# Patient Record
Sex: MALE | Race: WHITE | NOT HISPANIC OR LATINO | Employment: OTHER | URBAN - NONMETROPOLITAN AREA
[De-identification: names, ages, dates, MRNs, and addresses within clinical notes are randomized per-mention and may not be internally consistent; named-entity substitution may affect disease eponyms.]

---

## 2023-11-03 DIAGNOSIS — E78.2 MIXED HYPERLIPIDEMIA: ICD-10-CM

## 2023-11-13 PROBLEM — I73.00 RAYNAUD DISEASE: Status: ACTIVE | Noted: 2023-11-13

## 2023-11-13 PROBLEM — R47.81 SLURRED SPEECH: Status: ACTIVE | Noted: 2023-11-13

## 2023-11-13 PROBLEM — I25.10 ARTERIOSCLEROSIS OF CORONARY ARTERY: Status: ACTIVE | Noted: 2023-11-13

## 2023-11-13 PROBLEM — I48.0 PAROXYSMAL ATRIAL FIBRILLATION (MULTI): Status: ACTIVE | Noted: 2023-11-13

## 2023-11-13 PROBLEM — Z95.1 S/P CABG (CORONARY ARTERY BYPASS GRAFT): Status: ACTIVE | Noted: 2023-11-13

## 2023-11-13 PROBLEM — E78.5 HYPERLIPIDEMIA: Status: ACTIVE | Noted: 2023-11-13

## 2023-11-13 PROBLEM — G47.30 SLEEP APNEA: Status: ACTIVE | Noted: 2023-11-13

## 2023-11-13 PROBLEM — I10 ESSENTIAL HYPERTENSION: Status: ACTIVE | Noted: 2023-11-13

## 2023-11-13 RX ORDER — EZETIMIBE 10 MG/1
10 TABLET ORAL DAILY
COMMUNITY

## 2023-11-13 RX ORDER — OMEPRAZOLE 40 MG/1
40 CAPSULE, DELAYED RELEASE ORAL DAILY
COMMUNITY

## 2023-11-13 RX ORDER — LOSARTAN POTASSIUM 100 MG/1
100 TABLET ORAL DAILY
COMMUNITY
End: 2024-03-19

## 2023-11-13 RX ORDER — SILDENAFIL 25 MG/1
25 TABLET, FILM COATED ORAL AS NEEDED
COMMUNITY

## 2023-11-13 RX ORDER — SOTALOL HYDROCHLORIDE 80 MG/1
80 TABLET ORAL 2 TIMES DAILY
COMMUNITY
Start: 2023-09-28

## 2023-11-13 RX ORDER — ASPIRIN 81 MG/1
81 TABLET ORAL DAILY
COMMUNITY

## 2023-11-13 RX ORDER — HYDROCHLOROTHIAZIDE 12.5 MG/1
12.5 TABLET ORAL DAILY
COMMUNITY
Start: 2023-09-28 | End: 2024-04-25

## 2023-11-14 RX ORDER — EZETIMIBE 10 MG/1
10 TABLET ORAL DAILY
Qty: 90 TABLET | Refills: 3 | Status: SHIPPED | OUTPATIENT
Start: 2023-11-14

## 2024-03-06 PROBLEM — Z79.899 HIGH RISK MEDICATION USE: Status: ACTIVE | Noted: 2024-03-06

## 2024-03-19 DIAGNOSIS — I10 ESSENTIAL HYPERTENSION: ICD-10-CM

## 2024-03-19 RX ORDER — LOSARTAN POTASSIUM 100 MG/1
100 TABLET ORAL DAILY
Qty: 90 TABLET | Refills: 3 | Status: SHIPPED | OUTPATIENT
Start: 2024-03-19

## 2024-04-21 DIAGNOSIS — I10 ESSENTIAL HYPERTENSION: ICD-10-CM

## 2024-04-25 RX ORDER — HYDROCHLOROTHIAZIDE 12.5 MG/1
12.5 TABLET ORAL DAILY
Qty: 90 TABLET | Refills: 3 | Status: SHIPPED | OUTPATIENT
Start: 2024-04-25

## 2024-06-17 DIAGNOSIS — I48.0 PAROXYSMAL ATRIAL FIBRILLATION (MULTI): ICD-10-CM

## 2024-06-17 RX ORDER — SOTALOL HYDROCHLORIDE 80 MG/1
80 TABLET ORAL 2 TIMES DAILY
Qty: 180 TABLET | Refills: 3 | Status: SHIPPED | OUTPATIENT
Start: 2024-06-17 | End: 2025-06-17

## 2024-07-08 ENCOUNTER — APPOINTMENT (OUTPATIENT)
Dept: CARDIOLOGY | Facility: CLINIC | Age: 64
End: 2024-07-08
Payer: COMMERCIAL

## 2024-07-08 VITALS
HEIGHT: 74 IN | HEART RATE: 69 BPM | DIASTOLIC BLOOD PRESSURE: 80 MMHG | BODY MASS INDEX: 40.43 KG/M2 | SYSTOLIC BLOOD PRESSURE: 130 MMHG | WEIGHT: 315 LBS

## 2024-07-08 DIAGNOSIS — I48.0 PAROXYSMAL ATRIAL FIBRILLATION (MULTI): Primary | ICD-10-CM

## 2024-07-08 DIAGNOSIS — I25.10 ARTERIOSCLEROSIS OF CORONARY ARTERY: ICD-10-CM

## 2024-07-08 DIAGNOSIS — E78.2 MIXED HYPERLIPIDEMIA: ICD-10-CM

## 2024-07-08 DIAGNOSIS — Z79.899 HIGH RISK MEDICATION USE: ICD-10-CM

## 2024-07-08 DIAGNOSIS — Z95.1 S/P CABG (CORONARY ARTERY BYPASS GRAFT): ICD-10-CM

## 2024-07-08 DIAGNOSIS — I10 ESSENTIAL HYPERTENSION: ICD-10-CM

## 2024-07-08 PROCEDURE — 3079F DIAST BP 80-89 MM HG: CPT | Performed by: INTERNAL MEDICINE

## 2024-07-08 PROCEDURE — 93000 ELECTROCARDIOGRAM COMPLETE: CPT | Performed by: INTERNAL MEDICINE

## 2024-07-08 PROCEDURE — 3075F SYST BP GE 130 - 139MM HG: CPT | Performed by: INTERNAL MEDICINE

## 2024-07-08 PROCEDURE — 99214 OFFICE O/P EST MOD 30 MIN: CPT | Performed by: INTERNAL MEDICINE

## 2024-07-08 PROCEDURE — 3008F BODY MASS INDEX DOCD: CPT | Performed by: INTERNAL MEDICINE

## 2024-07-08 PROCEDURE — 1036F TOBACCO NON-USER: CPT | Performed by: INTERNAL MEDICINE

## 2024-07-08 NOTE — LETTER
"July 8, 2024     Shelton Buck DO  [address]  [facility] [address]    Patient: Regulo Smith   YOB: 1960   Date of Visit: 7/8/2024       Dear Dr. Shelton Buck, DO:    Thank you for referring Regulo Smith to me for evaluation. Below are my notes for this consultation.  If you have questions, please do not hesitate to call me. I look forward to following your patient along with you.       Sincerely,     Jacob Henderson MD      CC: No Recipients  ______________________________________________________________________________________    Subjective   Regulo Smith is a 63 y.o. male       Chief Complaint    Annual Exam          HPI     Patient returns in follow-up of problems as noted.  In the interim he is done relatively well.  He denies significant arrhythmia symptomatology but admits he only takes his sotalol once a day.  He was strongly encouraged to switch to twice a day and he agrees to do so.    His EKG is difficult to interpret.  Much like his previous EKGs P waves are very small.  He also has PACs making rhythm determination problematic.  He has, though, very visible P waves in leads V1 and V2.  First-degree AV block is noted.  Otherwise EKG is satisfactory.  Because of all the above we believe he is doing well we suggest continued therapy as is.  We did discuss the merits of antithrombotic therapy but he is reluctant to do so and overall his young age lowers his risk of stroke to a significant degree and because of this we suggest he could forego antithrombotic therapy at this time.  We did, though, advocate the merits of diet and weight loss to help improve his blood pressure.    Vitals:    07/08/24 0838 07/08/24 0918 07/08/24 1732   BP: (!) 140/94 (!) 160/96 130/80   BP Location: Left arm Left arm Right arm   Patient Position: Sitting Sitting Sitting   Pulse: 69     Weight: (!) 151 kg (332 lb)     Height: 1.88 m (6' 2\")          EKG done in office today     Objective "   Physical Exam  Constitutional:       Appearance: Normal appearance.   HENT:      Nose: Nose normal.   Neck:      Vascular: No carotid bruit.   Cardiovascular:      Rate and Rhythm: Normal rate.      Pulses: Normal pulses.      Heart sounds: Normal heart sounds.   Pulmonary:      Effort: Pulmonary effort is normal.   Abdominal:      General: Bowel sounds are normal.      Palpations: Abdomen is soft.   Musculoskeletal:         General: Normal range of motion.      Cervical back: Normal range of motion.      Right lower leg: No edema.      Left lower leg: No edema.   Skin:     General: Skin is warm and dry.   Neurological:      General: No focal deficit present.      Mental Status: He is alert.   Psychiatric:         Mood and Affect: Mood normal.         Behavior: Behavior normal.         Thought Content: Thought content normal.         Judgment: Judgment normal.         Allergies  Statins-hmg-coa reductase inhibitors     Current Medications    Current Outpatient Medications:   •  aspirin 81 mg EC tablet, Take 1 tablet (81 mg) by mouth once daily., Disp: , Rfl:   •  ezetimibe (Zetia) 10 mg tablet, Take 1 tablet by mouth once daily, Disp: 90 tablet, Rfl: 3  •  hydroCHLOROthiazide (Microzide) 12.5 mg tablet, Take 1 tablet by mouth once daily, Disp: 90 tablet, Rfl: 3  •  losartan (Cozaar) 100 mg tablet, Take 1 tablet by mouth once daily, Disp: 90 tablet, Rfl: 3  •  omeprazole (PriLOSEC) 40 mg DR capsule, Take 1 capsule (40 mg) by mouth once daily., Disp: , Rfl:   •  sildenafil (Viagra) 25 mg tablet, Take 1 tablet (25 mg) by mouth if needed for erectile dysfunction., Disp: , Rfl:   •  sotalol (Betapace) 80 mg tablet, Take 1 tablet (80 mg) by mouth 2 times a day., Disp: 180 tablet, Rfl: 3                     Assessment/Plan   1. Paroxysmal atrial fibrillation (Multi)  Sinus rhythm maintained effectively.  EKG difficult to interpret    2. S/P CABG (coronary artery bypass graft)  Asymptomatic following surgery.  Durable  relief of angina has been achieved    3. Essential hypertension  Review of treatment strategy demonstrates acceptable control    4. Arteriosclerosis of coronary artery  Asymptomatic ever since bypass    5. Mixed hyperlipidemia  Review of treatment strategy demonstrates acceptable control    6. High risk medication use  Acceptable QT interval on sotalol therapy.    7. BMI 40.0-44.9, adult (Multi)  The merits of diet and weight loss were advocated        Scribe Attestation  By signing my name below, I, Andrea Nur LPN   attest that this documentation has been prepared under the direction and in the presence of Jacob Henderson MD.     Provider Attestation - Scribe documentation    All medical record entries made by the Scribe were at my direction and personally dictated by me. I have reviewed the chart and agree that the record accurately reflects my personal performance of the history, physical exam, discussion and plan.

## 2024-07-08 NOTE — PROGRESS NOTES
"Subjective   Regulo Smith is a 63 y.o. male       Chief Complaint    Annual Exam          HPI     Patient returns in follow-up of problems as noted.  In the interim he is done relatively well.  He denies significant arrhythmia symptomatology but admits he only takes his sotalol once a day.  He was strongly encouraged to switch to twice a day and he agrees to do so.    His EKG is difficult to interpret.  Much like his previous EKGs P waves are very small.  He also has PACs making rhythm determination problematic.  He has, though, very visible P waves in leads V1 and V2.  First-degree AV block is noted.  Otherwise EKG is satisfactory.  Because of all the above we believe he is doing well we suggest continued therapy as is.  We did discuss the merits of antithrombotic therapy but he is reluctant to do so and overall his young age lowers his risk of stroke to a significant degree and because of this we suggest he could forego antithrombotic therapy at this time.  We did, though, advocate the merits of diet and weight loss to help improve his blood pressure.    Vitals:    07/08/24 0838 07/08/24 0918 07/08/24 1732   BP: (!) 140/94 (!) 160/96 130/80   BP Location: Left arm Left arm Right arm   Patient Position: Sitting Sitting Sitting   Pulse: 69     Weight: (!) 151 kg (332 lb)     Height: 1.88 m (6' 2\")          EKG done in office today     Objective   Physical Exam  Constitutional:       Appearance: Normal appearance.   HENT:      Nose: Nose normal.   Neck:      Vascular: No carotid bruit.   Cardiovascular:      Rate and Rhythm: Normal rate.      Pulses: Normal pulses.      Heart sounds: Normal heart sounds.   Pulmonary:      Effort: Pulmonary effort is normal.   Abdominal:      General: Bowel sounds are normal.      Palpations: Abdomen is soft.   Musculoskeletal:         General: Normal range of motion.      Cervical back: Normal range of motion.      Right lower leg: No edema.      Left lower leg: No edema. "   Skin:     General: Skin is warm and dry.   Neurological:      General: No focal deficit present.      Mental Status: He is alert.   Psychiatric:         Mood and Affect: Mood normal.         Behavior: Behavior normal.         Thought Content: Thought content normal.         Judgment: Judgment normal.         Allergies  Statins-hmg-coa reductase inhibitors     Current Medications    Current Outpatient Medications:     aspirin 81 mg EC tablet, Take 1 tablet (81 mg) by mouth once daily., Disp: , Rfl:     ezetimibe (Zetia) 10 mg tablet, Take 1 tablet by mouth once daily, Disp: 90 tablet, Rfl: 3    hydroCHLOROthiazide (Microzide) 12.5 mg tablet, Take 1 tablet by mouth once daily, Disp: 90 tablet, Rfl: 3    losartan (Cozaar) 100 mg tablet, Take 1 tablet by mouth once daily, Disp: 90 tablet, Rfl: 3    omeprazole (PriLOSEC) 40 mg DR capsule, Take 1 capsule (40 mg) by mouth once daily., Disp: , Rfl:     sildenafil (Viagra) 25 mg tablet, Take 1 tablet (25 mg) by mouth if needed for erectile dysfunction., Disp: , Rfl:     sotalol (Betapace) 80 mg tablet, Take 1 tablet (80 mg) by mouth 2 times a day., Disp: 180 tablet, Rfl: 3                     Assessment/Plan   1. Paroxysmal atrial fibrillation (Multi)  Sinus rhythm maintained effectively.  EKG difficult to interpret    2. S/P CABG (coronary artery bypass graft)  Asymptomatic following surgery.  Durable relief of angina has been achieved    3. Essential hypertension  Review of treatment strategy demonstrates acceptable control    4. Arteriosclerosis of coronary artery  Asymptomatic ever since bypass    5. Mixed hyperlipidemia  Review of treatment strategy demonstrates acceptable control    6. High risk medication use  Acceptable QT interval on sotalol therapy.    7. BMI 40.0-44.9, adult (Multi)  The merits of diet and weight loss were advocated        Scribe Attestation  By signing my name below, I, Nanette TOTH LPN , Scribe   attest that this documentation has been prepared  under the direction and in the presence of Jacob Henderson MD.     Provider Attestation - Scribe documentation    All medical record entries made by the Scribe were at my direction and personally dictated by me. I have reviewed the chart and agree that the record accurately reflects my personal performance of the history, physical exam, discussion and plan.

## 2024-09-06 ENCOUNTER — TELEPHONE (OUTPATIENT)
Dept: CARDIOLOGY | Facility: CLINIC | Age: 64
End: 2024-09-06
Payer: COMMERCIAL

## 2024-09-06 NOTE — TELEPHONE ENCOUNTER
Patient scheduled for hernia surgery on 9/30/24 with Dr. Mead. They are requesting cardiac clearance. Please advise     Fax # [phone]

## 2024-10-08 ENCOUNTER — TELEPHONE (OUTPATIENT)
Dept: CARDIOLOGY | Facility: CLINIC | Age: 64
End: 2024-10-08
Payer: COMMERCIAL

## 2024-10-08 DIAGNOSIS — I48.0 PAROXYSMAL ATRIAL FIBRILLATION (MULTI): ICD-10-CM

## 2024-10-08 DIAGNOSIS — R00.2 PALPITATIONS: ICD-10-CM

## 2024-10-09 NOTE — TELEPHONE ENCOUNTER
"Patient presents to office with complaint of a-fib and inquired about EKG. States he has noticed over the past 3-4 months and increase of palps. Duration is 5-10 min and frequency about 3 times weekly. Patient does have mild dyspnea when occurs. States he does have a loop recorder, but battery is \"dead\" Hx of ablation about 10 years ago.     Patient advised an EKG will only capture the rhythm at the time of the procedure. Would need to discuss with Dr. Gisselle Vicenet MD     Patient is not on anticoagulation. States was stopped over a year ago after his knee surgery. Patient had hernia surgery in Sept with Dr. Mead and when there the staff mention he was in a f-fib for pst.       "
Attempted to phone patient detailed message left. To [facility] for follow up.  
Lm to call and schedule EKG and koh  
Patient phones back, recommendations given, he verbalized understanding.     Orders prepped and sent to Dr. Gisselle Vicente MD for signature.     Task sent to  to call and arrange once order signed.     
Eyeglasses

## 2024-10-10 ENCOUNTER — ANCILLARY PROCEDURE (OUTPATIENT)
Dept: CARDIOLOGY | Facility: CLINIC | Age: 64
End: 2024-10-10
Payer: COMMERCIAL

## 2024-10-10 VITALS
SYSTOLIC BLOOD PRESSURE: 138 MMHG | WEIGHT: 315 LBS | HEIGHT: 74 IN | BODY MASS INDEX: 40.43 KG/M2 | HEART RATE: 71 BPM | DIASTOLIC BLOOD PRESSURE: 86 MMHG

## 2024-10-10 DIAGNOSIS — I48.0 PAROXYSMAL ATRIAL FIBRILLATION (MULTI): ICD-10-CM

## 2024-10-10 DIAGNOSIS — R00.2 PALPITATIONS: ICD-10-CM

## 2024-10-10 LAB — BODY SURFACE AREA: 2.77 M2

## 2024-10-10 PROCEDURE — 93000 ELECTROCARDIOGRAM COMPLETE: CPT | Performed by: INTERNAL MEDICINE

## 2024-10-10 RX ORDER — GLIMEPIRIDE 1 MG/1
1 TABLET ORAL
COMMUNITY
Start: 2024-09-19

## 2024-10-10 NOTE — PROGRESS NOTES
"Patient here for EKG visit ordered by Dr. Vicente due to SOB. Dr. Vicente in suite to review EKG prior to discharge. Patient here due to afib . Medication list Updated verbally.No cardiac complaints.     To Dr. Vicente to  read        Vitals:    10/10/24 1415   BP: 138/86   BP Location: Right arm   Patient Position: Sitting   Pulse: 71   Weight: 147 kg (325 lb)   Height: 1.88 m (6' 2\")       EKG done in office today   "

## 2024-11-13 LAB — BODY SURFACE AREA: 2.77 M2

## 2024-11-21 DIAGNOSIS — Z79.899 HIGH RISK MEDICATION USE: ICD-10-CM

## 2024-11-21 DIAGNOSIS — I48.91 ATRIAL FIBRILLATION, UNSPECIFIED TYPE (MULTI): ICD-10-CM

## 2024-11-21 NOTE — TELEPHONE ENCOUNTER
Patient phoned inquiring about recent Holter results 11/12/24, would like to know if it is time now to consider a blood thinner. He also was curious about the use of Berberine if you would recommend he is Pre Diabetic. Please advise    To Dr. Gisselle Vicente MD for review.

## 2024-11-22 RX ORDER — DABIGATRAN ETEXILATE 150 MG/1
150 CAPSULE ORAL 2 TIMES DAILY
Qty: 180 CAPSULE | Refills: 3 | Status: SHIPPED | OUTPATIENT
Start: 2024-11-22 | End: 2025-11-22

## 2024-11-22 NOTE — TELEPHONE ENCOUNTER
Spoke with patient and informed him of results and recommendations. Patient verbalized understanding.     Orders prepped and sent to Dr. Gisselle Vicente MD for signature.     Per [facility] patient to be scheduled for follow up 1/16/25 at 2:10 pm. Sent to scheduling to arrange.

## 2024-12-09 NOTE — TELEPHONE ENCOUNTER
Josie from [facility] pharmacy phones and states that patient's insurance will not cover pradaxa in the generic or brand name form. It is a non-formulary for patient's insurance. They are inquiring if there is an alternative medication to try. Please advise

## 2024-12-10 NOTE — TELEPHONE ENCOUNTER
Patient will check with insurance tomorrow and call and update office.  Patient will price Xarelto, Pradaxa, Eliquis with insurance. Advised Coumadin would be more affordable but would require routine lab work.

## 2025-01-16 ENCOUNTER — APPOINTMENT (OUTPATIENT)
Dept: CARDIOLOGY | Facility: CLINIC | Age: 65
End: 2025-01-16
Payer: COMMERCIAL

## 2025-01-16 VITALS
BODY MASS INDEX: 40.43 KG/M2 | DIASTOLIC BLOOD PRESSURE: 88 MMHG | SYSTOLIC BLOOD PRESSURE: 138 MMHG | HEIGHT: 74 IN | HEART RATE: 76 BPM | WEIGHT: 315 LBS

## 2025-01-16 DIAGNOSIS — I48.0 PAROXYSMAL ATRIAL FIBRILLATION (MULTI): ICD-10-CM

## 2025-01-16 DIAGNOSIS — I10 ESSENTIAL HYPERTENSION: ICD-10-CM

## 2025-01-16 DIAGNOSIS — E78.2 MIXED HYPERLIPIDEMIA: ICD-10-CM

## 2025-01-16 DIAGNOSIS — I25.10 MULTI-VESSEL CORONARY ARTERY STENOSIS: ICD-10-CM

## 2025-01-16 DIAGNOSIS — G47.33 OBSTRUCTIVE SLEEP APNEA SYNDROME: ICD-10-CM

## 2025-01-16 DIAGNOSIS — I48.19 PERSISTENT ATRIAL FIBRILLATION (MULTI): Primary | ICD-10-CM

## 2025-01-16 DIAGNOSIS — Z95.1 S/P CABG (CORONARY ARTERY BYPASS GRAFT): ICD-10-CM

## 2025-01-16 PROBLEM — I49.3 VENTRICULAR ECTOPIC BEAT: Status: ACTIVE | Noted: 2025-01-16

## 2025-01-16 PROCEDURE — 99215 OFFICE O/P EST HI 40 MIN: CPT | Performed by: INTERNAL MEDICINE

## 2025-01-16 PROCEDURE — 1036F TOBACCO NON-USER: CPT | Performed by: INTERNAL MEDICINE

## 2025-01-16 PROCEDURE — 3079F DIAST BP 80-89 MM HG: CPT | Performed by: INTERNAL MEDICINE

## 2025-01-16 PROCEDURE — 93000 ELECTROCARDIOGRAM COMPLETE: CPT | Performed by: INTERNAL MEDICINE

## 2025-01-16 PROCEDURE — 3008F BODY MASS INDEX DOCD: CPT | Performed by: INTERNAL MEDICINE

## 2025-01-16 PROCEDURE — 3075F SYST BP GE 130 - 139MM HG: CPT | Performed by: INTERNAL MEDICINE

## 2025-01-16 RX ORDER — ROSUVASTATIN CALCIUM 5 MG/1
5 TABLET, COATED ORAL DAILY
Qty: 90 TABLET | Refills: 3 | Status: SHIPPED | OUTPATIENT
Start: 2025-01-16 | End: 2026-01-16

## 2025-01-16 RX ORDER — DAPAGLIFLOZIN 5 MG/1
5 TABLET, FILM COATED ORAL DAILY
COMMUNITY

## 2025-01-16 RX ORDER — LANOLIN ALCOHOL/MO/W.PET/CERES
400 CREAM (GRAM) TOPICAL 2 TIMES DAILY
Qty: 180 TABLET | Refills: 3
Start: 2025-01-16 | End: 2026-01-16

## 2025-01-16 NOTE — PATIENT INSTRUCTIONS
Please bring all medicines, vitamins, and herbal supplements with you when you come to the office.    Prescriptions will not be filled unless you are compliant with your follow up appointments or have a follow up appointment scheduled as per instruction of your physician. Refills should be requested at the time of your visit.     BMI was above normal measurement. Current weight: 146 kg (321 lb 6.4 oz)  Weight change since last visit (-) denotes wt loss -3.6 lbs   Weight loss needed to achieve BMI 25: 127.1 Lbs  Weight loss needed to achieve BMI 30: 88.2 Lbs  Provided instructions on dietary changes  Provided instructions on exercise.    Ablation discussed  Tikosyn discussed  Mag ox 400 mg  6 months with EKG  Crestor 5 mg   Follow up

## 2025-01-16 NOTE — LETTER
January 16, 2025     Shelton Buck DO  [address]  [facility] [address]    Patient: Regulo Smith   YOB: 1960   Date of Visit: 1/16/2025       Dear Dr. Shelton Buck, DO:    Thank you for referring Regulo Smith to me for evaluation. Below are my notes for this consultation.  If you have questions, please do not hesitate to call me. I look forward to following your patient along with you.       Sincerely,     Gisselle Vicente MD      CC: No Recipients  ______________________________________________________________________________________    Subjective   Regulo Smith is a 64 y.o. male       Chief Complaint    Annual Exam          HPI   Patient is here for of follow-up continue management for coronary artery disease and atrial fibrillation.  He is a former patient of Dr. Henderson.  He report history of coronary artery disease and by prior bypass surgery back in 2013, persistent atrial fibrillation maintaining sinus rhythm with sotalol.  He called recently with irregular rhythm and outpatient event monitor demonstrated long episode of atrial fibrillation and today's EKG showed sinus but lots of ectopy alternating with A-fib.  Patient reports he started more aggressive diet because of recent diagnosis of prediabetes.  He denies chest pain, palpitation, lightheadedness, dizziness or syncope.  He is reasonably active.  He was started on anticoagulation due to atrial fibrillation.    Assessment    1.  Persistent atrial fibrillation on sotalol.  Appears to be in and out of A-fib.  Patient underwent RF ablation in [place] 8 years ago but had recurrence requiring antiarrhythmic therapy  2.  Coronary artery disease with prior four-vessel bypass surgery with LIMA to the LAD, saphenous vein graft to marginal 1, saphenous vein graft to marginal 2 and saphenous vein graft to the PDA  3.  Morbid obesity  4.  Chronic anticoagulation  5.  Hyperlipidemia with history of intolerance to  "atorvastatin  6.  Essential hypertension  7.  Obstructive sleep apnea compliant with CPAP  8.  Asymptomatic PVCs patient scruff functional class I denies lightheadedness or dizziness.  Recent event monitor noted    Plan    1.  We focused on treatment for atrial fibrillation.  I am reluctant to increase his sotalol because of tendency for bradycardia noted on his prior EKG.  We discussed switching to dofetilide versus amiodarone versus repeat ablation.  Following lengthy discussion the patient elected to remain on current therapy and we will revisit the issue next office visit  2.  Risk, benefit and alternative anticoagulation reviewed with patient at length he understood and agreed  3.  I advised him to add magnesium oxide 400 mg twice daily  4.  I advised him to try Crestor 5 mg daily to see if he tolerates that  5.  I discussed them with him at great length risk factor modification with special emphasis on losing weight and I told him that he should consider treatment with Ozempic or Mounjaro  Review of Systems   All other systems reviewed and are negative.           Vitals:    01/16/25 1358 01/16/25 1430   BP: 148/88 138/88   BP Location: Right arm Right arm   Patient Position: Sitting    Pulse: 76    Weight: 146 kg (321 lb 6.4 oz)    Height: 1.88 m (6' 2\")          EKG done in office today     Objective   Physical Exam  Constitutional:       Appearance: Normal appearance.   HENT:      Nose: Nose normal.   Neck:      Vascular: No carotid bruit.   Cardiovascular:      Rate and Rhythm: Normal rate. Rhythm irregularly irregular.      Pulses: Normal pulses.      Heart sounds: Normal heart sounds.   Pulmonary:      Effort: Pulmonary effort is normal.   Abdominal:      General: Bowel sounds are normal.      Palpations: Abdomen is soft.   Musculoskeletal:         General: Normal range of motion.      Cervical back: Normal range of motion.      Right lower leg: No edema.      Left lower leg: No edema.   Skin:     " General: Skin is warm and dry.   Neurological:      General: No focal deficit present.      Mental Status: He is alert.   Psychiatric:         Mood and Affect: Mood normal.         Behavior: Behavior normal.         Thought Content: Thought content normal.         Judgment: Judgment normal.         Allergies  Statins-hmg-coa reductase inhibitors     Current Medications    Current Outpatient Medications:   •  apixaban (Eliquis) 5 mg tablet, Take 1 tablet (5 mg) by mouth 2 times a day., Disp: 180 tablet, Rfl: 3  •  aspirin 81 mg EC tablet, Take 1 tablet (81 mg) by mouth once daily., Disp: , Rfl:   •  ezetimibe (Zetia) 10 mg tablet, Take 1 tablet by mouth once daily, Disp: 90 tablet, Rfl: 3  •  Farxiga 5 mg, Take 1 tablet (5 mg) by mouth once daily., Disp: , Rfl:   •  hydroCHLOROthiazide (Microzide) 12.5 mg tablet, Take 1 tablet by mouth once daily, Disp: 90 tablet, Rfl: 3  •  losartan (Cozaar) 100 mg tablet, Take 1 tablet by mouth once daily, Disp: 90 tablet, Rfl: 3  •  omeprazole (PriLOSEC) 40 mg DR capsule, Take 1 capsule (40 mg) by mouth once daily., Disp: , Rfl:   •  sildenafil (Viagra) 25 mg tablet, Take 1 tablet (25 mg) by mouth if needed for erectile dysfunction., Disp: , Rfl:   •  sotalol (Betapace) 80 mg tablet, Take 1 tablet (80 mg) by mouth 2 times a day., Disp: 180 tablet, Rfl: 3  •  magnesium oxide (Mag-Ox) 400 mg (241.3 mg magnesium) tablet, Take 1 tablet (400 mg) by mouth 2 times a day., Disp: 180 tablet, Rfl: 3  •  rosuvastatin (Crestor) 5 mg tablet, Take 1 tablet (5 mg) by mouth once daily., Disp: 90 tablet, Rfl: 3                     Assessment/Plan   1. Persistent atrial fibrillation (Multi)  ECG 12 Lead    magnesium oxide (Mag-Ox) 400 mg (241.3 mg magnesium) tablet      2. Paroxysmal atrial fibrillation (Multi)  Follow Up In Cardiology      3. S/P CABG (coronary artery bypass graft)        4. Mixed hyperlipidemia  rosuvastatin (Crestor) 5 mg tablet    Alanine Aminotransferase    Aspartate  Aminotransferase    Lipid Panel    Alanine Aminotransferase    Aspartate Aminotransferase    Lipid Panel      5. Essential hypertension        6. Multi-vessel coronary artery stenosis  Follow Up In Cardiology    rosuvastatin (Crestor) 5 mg tablet      7. BMI 40.0-44.9, adult (Multi)        8. Obstructive sleep apnea syndrome                 Scribe Attestation  By signing my name below, Meaghan MAC LPN, Scribe   attest that this documentation has been prepared under the direction and in the presence of Gisselle Vicente MD.     Provider Attestation - Scribe documentation    All medical record entries made by the Scribe were at my direction and personally dictated by me. I have reviewed the chart and agree that the record accurately reflects my personal performance of the history, physical exam, discussion and plan.

## 2025-01-16 NOTE — PROGRESS NOTES
Subjective   Regulo Smith is a 64 y.o. male       Chief Complaint    Annual Exam          HPI   Patient is here for of follow-up continue management for coronary artery disease and atrial fibrillation.  He is a former patient of Dr. Henderson.  He report history of coronary artery disease and by prior bypass surgery back in 2013, persistent atrial fibrillation maintaining sinus rhythm with sotalol.  He called recently with irregular rhythm and outpatient event monitor demonstrated long episode of atrial fibrillation and today's EKG showed sinus but lots of ectopy alternating with A-fib.  Patient reports he started more aggressive diet because of recent diagnosis of prediabetes.  He denies chest pain, palpitation, lightheadedness, dizziness or syncope.  He is reasonably active.  He was started on anticoagulation due to atrial fibrillation.    Assessment    1.  Persistent atrial fibrillation on sotalol.  Appears to be in and out of A-fib.  Patient underwent RF ablation in [place] 8 years ago but had recurrence requiring antiarrhythmic therapy  2.  Coronary artery disease with prior four-vessel bypass surgery with LIMA to the LAD, saphenous vein graft to marginal 1, saphenous vein graft to marginal 2 and saphenous vein graft to the PDA  3.  Morbid obesity  4.  Chronic anticoagulation  5.  Hyperlipidemia with history of intolerance to atorvastatin  6.  Essential hypertension  7.  Obstructive sleep apnea compliant with CPAP  8.  Asymptomatic PVCs patient scruff functional class I denies lightheadedness or dizziness.  Recent event monitor noted    Plan    1.  We focused on treatment for atrial fibrillation.  I am reluctant to increase his sotalol because of tendency for bradycardia noted on his prior EKG.  We discussed switching to dofetilide versus amiodarone versus repeat ablation.  Following lengthy discussion the patient elected to remain on current therapy and we will revisit the issue next office visit  2.  Risk,  "benefit and alternative anticoagulation reviewed with patient at length he understood and agreed  3.  I advised him to add magnesium oxide 400 mg twice daily  4.  I advised him to try Crestor 5 mg daily to see if he tolerates that  5.  I discussed them with him at great length risk factor modification with special emphasis on losing weight and I told him that he should consider treatment with Ozempic or Mounjaro  Review of Systems   All other systems reviewed and are negative.           Vitals:    01/16/25 1358 01/16/25 1430   BP: 148/88 138/88   BP Location: Right arm Right arm   Patient Position: Sitting    Pulse: 76    Weight: 146 kg (321 lb 6.4 oz)    Height: 1.88 m (6' 2\")          EKG done in office today     Objective   Physical Exam  Constitutional:       Appearance: Normal appearance.   HENT:      Nose: Nose normal.   Neck:      Vascular: No carotid bruit.   Cardiovascular:      Rate and Rhythm: Normal rate. Rhythm irregularly irregular.      Pulses: Normal pulses.      Heart sounds: Normal heart sounds.   Pulmonary:      Effort: Pulmonary effort is normal.   Abdominal:      General: Bowel sounds are normal.      Palpations: Abdomen is soft.   Musculoskeletal:         General: Normal range of motion.      Cervical back: Normal range of motion.      Right lower leg: No edema.      Left lower leg: No edema.   Skin:     General: Skin is warm and dry.   Neurological:      General: No focal deficit present.      Mental Status: He is alert.   Psychiatric:         Mood and Affect: Mood normal.         Behavior: Behavior normal.         Thought Content: Thought content normal.         Judgment: Judgment normal.         Allergies  Statins-hmg-coa reductase inhibitors     Current Medications    Current Outpatient Medications:     apixaban (Eliquis) 5 mg tablet, Take 1 tablet (5 mg) by mouth 2 times a day., Disp: 180 tablet, Rfl: 3    aspirin 81 mg EC tablet, Take 1 tablet (81 mg) by mouth once daily., Disp: , Rfl:    "  ezetimibe (Zetia) 10 mg tablet, Take 1 tablet by mouth once daily, Disp: 90 tablet, Rfl: 3    Farxiga 5 mg, Take 1 tablet (5 mg) by mouth once daily., Disp: , Rfl:     hydroCHLOROthiazide (Microzide) 12.5 mg tablet, Take 1 tablet by mouth once daily, Disp: 90 tablet, Rfl: 3    losartan (Cozaar) 100 mg tablet, Take 1 tablet by mouth once daily, Disp: 90 tablet, Rfl: 3    omeprazole (PriLOSEC) 40 mg DR capsule, Take 1 capsule (40 mg) by mouth once daily., Disp: , Rfl:     sildenafil (Viagra) 25 mg tablet, Take 1 tablet (25 mg) by mouth if needed for erectile dysfunction., Disp: , Rfl:     sotalol (Betapace) 80 mg tablet, Take 1 tablet (80 mg) by mouth 2 times a day., Disp: 180 tablet, Rfl: 3    magnesium oxide (Mag-Ox) 400 mg (241.3 mg magnesium) tablet, Take 1 tablet (400 mg) by mouth 2 times a day., Disp: 180 tablet, Rfl: 3    rosuvastatin (Crestor) 5 mg tablet, Take 1 tablet (5 mg) by mouth once daily., Disp: 90 tablet, Rfl: 3                     Assessment/Plan   1. Persistent atrial fibrillation (Multi)  ECG 12 Lead    magnesium oxide (Mag-Ox) 400 mg (241.3 mg magnesium) tablet      2. Paroxysmal atrial fibrillation (Multi)  Follow Up In Cardiology      3. S/P CABG (coronary artery bypass graft)        4. Mixed hyperlipidemia  rosuvastatin (Crestor) 5 mg tablet    Alanine Aminotransferase    Aspartate Aminotransferase    Lipid Panel    Alanine Aminotransferase    Aspartate Aminotransferase    Lipid Panel      5. Essential hypertension        6. Multi-vessel coronary artery stenosis  Follow Up In Cardiology    rosuvastatin (Crestor) 5 mg tablet      7. BMI 40.0-44.9, adult (Multi)        8. Obstructive sleep apnea syndrome                 Scribe Attestation  By signing my name below, Meaghan MAC LPN, Scribe   attest that this documentation has been prepared under the direction and in the presence of Gisselle Vicente MD.     Provider Attestation - Scribe documentation    All medical record entries made by the  Scribe were at my direction and personally dictated by me. I have reviewed the chart and agree that the record accurately reflects my personal performance of the history, physical exam, discussion and plan.

## 2025-03-03 DIAGNOSIS — I10 ESSENTIAL HYPERTENSION: ICD-10-CM

## 2025-03-04 RX ORDER — LOSARTAN POTASSIUM 100 MG/1
100 TABLET ORAL DAILY
Qty: 90 TABLET | Refills: 3 | Status: SHIPPED | OUTPATIENT
Start: 2025-03-04

## 2025-04-15 DIAGNOSIS — I10 ESSENTIAL HYPERTENSION: ICD-10-CM

## 2025-04-15 RX ORDER — HYDROCHLOROTHIAZIDE 12.5 MG/1
12.5 TABLET ORAL DAILY
Qty: 90 TABLET | Refills: 3 | Status: SHIPPED | OUTPATIENT
Start: 2025-04-15 | End: 2026-04-15

## 2025-07-01 ENCOUNTER — APPOINTMENT (OUTPATIENT)
Dept: CARDIOLOGY | Facility: CLINIC | Age: 65
End: 2025-07-01
Payer: COMMERCIAL

## 2025-07-02 DIAGNOSIS — I48.0 PAROXYSMAL ATRIAL FIBRILLATION (MULTI): ICD-10-CM

## 2025-07-02 RX ORDER — SOTALOL HYDROCHLORIDE 80 MG/1
80 TABLET ORAL 2 TIMES DAILY
Qty: 180 TABLET | Refills: 3 | Status: SHIPPED | OUTPATIENT
Start: 2025-07-02

## 2025-07-14 ENCOUNTER — APPOINTMENT (OUTPATIENT)
Dept: CARDIOLOGY | Facility: CLINIC | Age: 65
End: 2025-07-14
Payer: COMMERCIAL

## 2025-07-16 ENCOUNTER — APPOINTMENT (OUTPATIENT)
Dept: CARDIOLOGY | Facility: CLINIC | Age: 65
End: 2025-07-16
Payer: COMMERCIAL

## 2025-07-16 VITALS
DIASTOLIC BLOOD PRESSURE: 80 MMHG | BODY MASS INDEX: 39.53 KG/M2 | HEIGHT: 74 IN | SYSTOLIC BLOOD PRESSURE: 144 MMHG | HEART RATE: 75 BPM | WEIGHT: 308 LBS

## 2025-07-16 DIAGNOSIS — Z79.899 HIGH RISK MEDICATION USE: ICD-10-CM

## 2025-07-16 DIAGNOSIS — I25.10 MULTI-VESSEL CORONARY ARTERY STENOSIS: ICD-10-CM

## 2025-07-16 DIAGNOSIS — E78.2 MIXED HYPERLIPIDEMIA: ICD-10-CM

## 2025-07-16 DIAGNOSIS — I73.00 RAYNAUD'S DISEASE WITHOUT GANGRENE: ICD-10-CM

## 2025-07-16 DIAGNOSIS — I48.19 PERSISTENT ATRIAL FIBRILLATION (MULTI): Primary | ICD-10-CM

## 2025-07-16 DIAGNOSIS — I10 ESSENTIAL HYPERTENSION: ICD-10-CM

## 2025-07-16 DIAGNOSIS — I49.3 VENTRICULAR ECTOPIC BEAT: ICD-10-CM

## 2025-07-16 DIAGNOSIS — Z95.1 S/P CABG (CORONARY ARTERY BYPASS GRAFT): ICD-10-CM

## 2025-07-16 PROCEDURE — 99214 OFFICE O/P EST MOD 30 MIN: CPT | Performed by: INTERNAL MEDICINE

## 2025-07-16 PROCEDURE — 3008F BODY MASS INDEX DOCD: CPT | Performed by: INTERNAL MEDICINE

## 2025-07-16 PROCEDURE — 3077F SYST BP >= 140 MM HG: CPT | Performed by: INTERNAL MEDICINE

## 2025-07-16 PROCEDURE — 3079F DIAST BP 80-89 MM HG: CPT | Performed by: INTERNAL MEDICINE

## 2025-07-16 PROCEDURE — 93000 ELECTROCARDIOGRAM COMPLETE: CPT | Performed by: INTERNAL MEDICINE

## 2025-07-16 PROCEDURE — 1036F TOBACCO NON-USER: CPT | Performed by: INTERNAL MEDICINE

## 2025-07-16 NOTE — PATIENT INSTRUCTIONS
Please bring all medicines, vitamins, and herbal supplements with you when you come to the office.    Prescriptions will not be filled unless you are compliant with your follow up appointments or have a follow up appointment scheduled as per instruction of your physician. Refills should be requested at the time of your visit.      Follow up   Increase Betapace -2 days in hospital    BMI was above normal measurement. Current weight: 140 kg (308 lb)  Weight change since last visit (-) denotes wt loss -13.4 lbs   Weight loss needed to achieve BMI 25: 113.7 Lbs  Weight loss needed to achieve BMI 30: 74.8 Lbs  Provided instructions on dietary changes  Provided instructions on exercise.

## 2025-07-16 NOTE — PROGRESS NOTES
Chief Complaint   Patient presents with    Follow-up     Patient here for 6 month follow up for coronary artery disease, c/o shortness of breath and occasional episodes of atrial fibrillation.        Roselyn Smith is a 64 y.o. male     HPI   Patient is here for follow-up and management for persistent atrial fibrillation with prior ablation 8 9 years ago.  He had documentation of recurrence based on event monitor 6 months ago.  He continues to complain of symptoms of palpitation, increasing shortness of breath and episodes of high heart rate.  He denies lightheadedness, dizziness or syncope.  Last time I talked to him about increasing sotalol or switching dofetilide but the patient elected to remain on same treatment.    Assessment     1.  Persistent atrial fibrillation on sotalol.  Appears to be in and out of A-fib.  Patient underwent RF ablation in [place] 8 years ago but had recurrence requiring antiarrhythmic therapy.  His recent event monitor showed continued breakthrough arrhythmia  2.  Coronary artery disease with prior four-vessel bypass surgery with LIMA to the LAD, saphenous vein graft to marginal 1, saphenous vein graft to marginal 2 and saphenous vein graft to the PDA  3.  Morbid obesity with recent weight loss  4.  Chronic anticoagulation tolerated without any side effect  5.  Hyperlipidemia with history of intolerance to atorvastatin  6.  Essential hypertension  7.  Obstructive sleep apnea compliant with CPAP  8.  Asymptomatic PVCs patient scruff functional class I denies lightheadedness or dizziness.  Recent event monitor noted     Plan     1.  We focused on treatment for atrial fibrillation.  We discussed increasing sotalol, versus switching to dofetilide versus ablation.  Following this discussion the patient doing elected to increase sotalol to 220 mg twice daily.  I told him success rate is in the 70 to 80% range in controlling his arrhythmia  2.  Risk, benefit and alternative  "anticoagulation reviewed with patient at length he understood and agreed  3.  I advised him to continue the rest of his medical regimen  4.  Patient seems to be tolerating Crestora  5.  I discussed them with him at great length risk factor modification with special emphasis on losing weight and I told him that he should consider treatment with Ozempic or Mounjaro  Review of Systems   All other systems reviewed and are negative.       EKG done in office today    Vitals:    07/16/25 1521   BP: 144/80   BP Location: Right arm   Patient Position: Sitting   Pulse: 75   Weight: 140 kg (308 lb)   Height: 1.88 m (6' 2\")        Objective   Physical Exam  Constitutional:       Appearance: Normal appearance.   HENT:      Nose: Nose normal.   Neck:      Vascular: No carotid bruit.     Cardiovascular:      Rate and Rhythm: Normal rate.      Pulses: Normal pulses.      Heart sounds: Normal heart sounds.   Pulmonary:      Effort: Pulmonary effort is normal.   Abdominal:      General: Bowel sounds are normal.      Palpations: Abdomen is soft.     Musculoskeletal:         General: Normal range of motion.      Cervical back: Normal range of motion.      Right lower leg: No edema.      Left lower leg: No edema.     Skin:     General: Skin is warm and dry.     Neurological:      General: No focal deficit present.      Mental Status: He is alert.     Psychiatric:         Mood and Affect: Mood normal.         Behavior: Behavior normal.         Thought Content: Thought content normal.         Judgment: Judgment normal.         Allergies  Statins-hmg-coa reductase inhibitors     Current Medications  Current Outpatient Medications   Medication Instructions    apixaban (ELIQUIS) 5 mg, oral, 2 times daily    aspirin 81 mg, Daily    ezetimibe (ZETIA) 10 mg, oral, Daily    Farxiga 5 mg, Daily    hydroCHLOROthiazide (MICROZIDE) 12.5 mg, oral, Daily    losartan (COZAAR) 100 mg, oral, Daily    magnesium oxide (MAG-OX) 400 mg, oral, 2 times daily "    omeprazole (PRILOSEC) 40 mg, Daily    rosuvastatin (CRESTOR) 5 mg, oral, Daily    sildenafil (VIAGRA) 25 mg, As needed    sotalol (BETAPACE) 80 mg, oral, 2 times daily                        Assessment/Plan   1. Persistent atrial fibrillation (Multi)  Follow Up In Cardiology    Antiarrythmic Initiation Monitoring Appointment Request    ECG 12 Lead      2. Multi-vessel coronary artery stenosis  Follow Up In Cardiology      3. High risk medication use        4. Ventricular ectopic beat        5. S/P CABG (coronary artery bypass graft)        6. Raynaud's disease without gangrene        7. Mixed hyperlipidemia        8. Essential hypertension        9. BMI 39.0-39.9,adult                 Scribe Attestation  By signing my name below, IMeaghan LPN, Scribe   attest that this documentation has been prepared under the direction and in the presence of Gisselle Vicente MD.     Provider Attestation - Scribe documentation    All medical record entries made by the Scribe were at my direction and personally dictated by me. I have reviewed the chart and agree that the record accurately reflects my personal performance of the history, physical exam, discussion and plan.

## 2025-08-06 ENCOUNTER — TELEPHONE (OUTPATIENT)
Dept: CARDIOLOGY | Facility: CLINIC | Age: 65
End: 2025-08-06
Payer: COMMERCIAL